# Patient Record
Sex: FEMALE | URBAN - METROPOLITAN AREA
[De-identification: names, ages, dates, MRNs, and addresses within clinical notes are randomized per-mention and may not be internally consistent; named-entity substitution may affect disease eponyms.]

---

## 2021-06-29 ENCOUNTER — APPOINTMENT (OUTPATIENT)
Dept: URBAN - METROPOLITAN AREA CLINIC 254 | Age: 61
Setting detail: DERMATOLOGY
End: 2021-06-30

## 2021-06-29 ENCOUNTER — APPOINTMENT (OUTPATIENT)
Dept: URBAN - METROPOLITAN AREA CLINIC 254 | Age: 61
Setting detail: DERMATOLOGY
End: 2021-07-01

## 2021-06-29 VITALS — HEIGHT: 63 IN | RESPIRATION RATE: 14 BRPM | WEIGHT: 128 LBS

## 2021-06-29 DIAGNOSIS — L72.0 EPIDERMAL CYST: ICD-10-CM

## 2021-06-29 PROCEDURE — OTHER COUNSELING: OTHER

## 2021-06-29 PROCEDURE — OTHER BENIGN DESTRUCTION COSMETIC: OTHER

## 2021-06-29 PROCEDURE — 99202 OFFICE O/P NEW SF 15 MIN: CPT

## 2021-06-29 ASSESSMENT — LOCATION ZONE DERM
LOCATION ZONE: FACE
LOCATION ZONE: FACE

## 2021-06-29 ASSESSMENT — LOCATION SIMPLE DESCRIPTION DERM
LOCATION SIMPLE: LEFT CHEEK
LOCATION SIMPLE: RIGHT CHEEK
LOCATION SIMPLE: GLABELLA
LOCATION SIMPLE: LEFT FOREHEAD
LOCATION SIMPLE: LEFT CHEEK

## 2021-06-29 ASSESSMENT — LOCATION DETAILED DESCRIPTION DERM
LOCATION DETAILED: LEFT INFERIOR MEDIAL FOREHEAD
LOCATION DETAILED: GLABELLA
LOCATION DETAILED: LEFT INFERIOR NASAL CHEEK
LOCATION DETAILED: LEFT INFERIOR MEDIAL MALAR CHEEK
LOCATION DETAILED: RIGHT INFERIOR MEDIAL MALAR CHEEK
LOCATION DETAILED: LEFT MEDIAL MALAR CHEEK

## 2021-06-29 NOTE — PROCEDURE: COUNSELING
Patient Specific Counseling (Will Not Stick From Patient To Patient): - The most common method for treatment is extraction.\\n- Treated lesions may recur, but more will likely develop over time.\\n- Patient requested treatment today.\\n- Advised that treatment is available but not medically necessary.\\n- Recommended treatment with extraction and discussed cost of treatment.\\n- Patient expressed understanding.\\n\\nCost of treatment today: $100
Detail Level: Detailed

## 2021-06-29 NOTE — PROCEDURE: BENIGN DESTRUCTION COSMETIC
Price (Use Numbers Only, No Special Characters Or $): 100
Consent: - Verbal and written consent was obtained, and risks were reviewed prior to procedure today.\\n- Risks discussed include but are not limited to pain, crusting, scabbing, scarring, temporary or permanent pigmentary change, recurrence, incomplete resolution, and infection.\\n- The patient understands that the procedure is cosmetic in nature and is not covered by or billable to insurance.
Anesthesia Volume In Cc: 0.5
Post-Care Instructions: - Patient was instructed to avoid picking at any of the treated lesions.\\n- Vaseline ointment or Aquaphor can be applied to any open wounds daily until healed.
Detail Level: Detailed